# Patient Record
(demographics unavailable — no encounter records)

---

## 2025-03-05 NOTE — ASSESSMENT
[FreeTextEntry1] : 41yo female with hx colitis, with diarrhea and bleeding  will check labs and stool studies  if fecal calprotectin elevated will start budesonide consider prednisone as will be travelling  if negative and no signs of inflammation, then likely IBS with hemorrhoidal bleeding consider xifaxan in that setting

## 2025-03-05 NOTE — REVIEW OF SYSTEMS
[As Noted in HPI] : as noted in HPI [Diarrhea] : diarrhea [Bleeding] : bleeding [Negative] : Heme/Lymph

## 2025-03-05 NOTE — ASSESSMENT
[FreeTextEntry1] : 43yo female with hx colitis, with diarrhea and bleeding  will check labs and stool studies  if fecal calprotectin elevated will start budesonide consider prednisone as will be travelling  if negative and no signs of inflammation, then likely IBS with hemorrhoidal bleeding consider xifaxan in that setting

## 2025-03-05 NOTE — HISTORY OF PRESENT ILLNESS
[FreeTextEntry1] : 43yo female with colitis  She has recent increase in diarrhea, urgency with bleeding She is unsure if due to increased stress or represents a flare of her colitis

## 2025-03-05 NOTE — HISTORY OF PRESENT ILLNESS
[FreeTextEntry1] : 41yo female with colitis  She has recent increase in diarrhea, urgency with bleeding She is unsure if due to increased stress or represents a flare of her colitis

## 2025-03-05 NOTE — PHYSICAL EXAM
[Alert] : alert [Normal Voice/Communication] : normal voice/communication [Healthy Appearing] : healthy appearing [No Acute Distress] : no acute distress [Hearing Threshold Finger Rub Not [place]] : hearing was normal [Sclera] : the sclera and conjunctiva were normal [Normal Lips/Gums] : the lips and gums were normal [Oropharynx] : the oropharynx was normal [Normal Appearance] : the appearance of the neck was normal [No Neck Mass] : no neck mass was observed [No Respiratory Distress] : no respiratory distress [No Acc Muscle Use] : no accessory muscle use [Respiration, Rhythm And Depth] : normal respiratory rhythm and effort [Auscultation Breath Sounds / Voice Sounds] : lungs were clear to auscultation bilaterally [Heart Rate And Rhythm] : heart rate was normal and rhythm regular [Normal S1, S2] : normal S1 and S2 [Murmurs] : no murmurs [Bowel Sounds] : normal bowel sounds [Abdomen Tenderness] : non-tender [No Masses] : no abdominal mass palpated [Abdomen Soft] : soft [] : no hepatosplenomegaly [Oriented To Time, Place, And Person] : oriented to person, place, and time

## 2025-03-25 NOTE — PLAN
[FreeTextEntry1] : PT WITH LEVATOR SPASM. WILL REFER TO UROGYN FOR CONSIDERATION OF BOTOX TO PELVIC FLOOR. HAS TRIED PT WITHOUT SUCCESS.

## 2025-03-25 NOTE — PHYSICAL EXAM
[Chaperone Declined] : Patient declined chaperone [Appropriately responsive] : appropriately responsive [Alert] : alert [No Acute Distress] : no acute distress [Soft] : soft [Non-tender] : non-tender [Non-distended] : non-distended [No HSM] : No HSM [No Lesions] : no lesions [No Mass] : no mass [Oriented x3] : oriented x3 [Breast Reconstruction Right] : breast reconstruction [Breast Reconstruction Left] : breast reconstruction [Atrophy] : atrophy [Absent] : absent [FreeTextEntry4] : TIGHT INTROITUS, LEVATOR SPASM

## 2025-05-29 NOTE — REASON FOR VISIT
[Initial Visit ___] : an initial visit for [unfilled] [Questionnaire Received] : Patient questionnaire received [Intake Form Reviewed] : Patient intake form with past medical history, surgical history, family history and social history reviewed today [Sexual Dysfunction] : sexual dysfunction

## 2025-05-29 NOTE — PROCEDURE
[Straight Catheterization] : insertion of a straight catheter [Urinary Frequency] : urinary frequency [Patient] : the patient [___ Fr Straight Tip] : a [unfilled] in [place] straight tip catheter [None] : there were no complications with the catheter insertion [Clear] : clear [No Complications] : no complications [Tolerated Well] : the patient tolerated the procedure well [Post procedure instructions and information given] : Post procedure instructions and information were given and reviewed with patient. [0] : 0

## 2025-05-29 NOTE — PROCEDURE
[Straight Catheterization] : insertion of a straight catheter [Urinary Frequency] : urinary frequency [Patient] : the patient [___ Fr Straight Tip] : a [unfilled] in [place] straight tip catheter [None] : there were no complications with the catheter insertion [Clear] : clear [No Complications] : no complications [Tolerated Well] : the patient tolerated the procedure well [Post procedure instructions and information given] : Post procedure instructions and information were given and reviewed with patient. [0] : 0

## 2025-05-29 NOTE — HISTORY OF PRESENT ILLNESS
[Vaginal Wall Prolapse] : no [Rectal Prolapse] : no [Unable To Restrain Bowel Movement] : no [Urinary Frequency] : no [Feelings Of Urinary Urgency] : mild [x1] : nocturia once nightly [Constipation Obstructed Defecation] : no [Pelvic Pain] : no [Vaginal Pain] : no [Urinary Tract Infection] : mild [] : years ago [FreeTextEntry1] : JOE is a 43 year female who presents for dyspareunia. 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 H/o robotic TLH BSO for cancer prevention and found to have endo. S/p double mastectomy for pre-cancer, everyone in her family had breast cancer. She has not been able to have intercourse since going through menopause. Using estring for years. Went to PF PT with some improvement but was very time consuming and still not able to have intercourse. Tried diazepam 5mg suppositories which did not really help.  Reports pain at one spot with intercourse, muscle spasm like. She has had these symptoms since going through menopause 3 years ago. C/o frequency and urgency but admits to drinking a lot of water. Denies any constipation. She has ulcerative colitis. Denies pain outside of intercourse.     Daytime frequency: Yes Nocturia: 1x Urinary urgency: Yes Leakage of urine with urgency: No Leakage of urine with coughing sneezing laughing: No Sensation of incomplete bladder emptying: Yes History of frequent urinary tract infections: No History of hematuria: No Previous treatment: Vaginal estrogen, PF PT, Diazepam suppositories  Vaginal symptoms: Difficulty with sexual activity Bowel symptoms: Denies constipation      OB: 3 C/S GYN: LMP 10/2022, Negative pap 03/25/2025, H/o abnormal pap, Vaginal estrogen use PMH: IBS, Lupus, UC PSH: C/S x3, Abdominoplasty, Double mastectomy + Reconstruction, Hysterectomy, Breast reduction  Meds: Plaquenil, Budesonide, Trazodone, Vit D, Zyrtec, Xioxial, Turmeric, Vit C  Allx: Seasonal

## 2025-05-29 NOTE — PHYSICAL EXAM
[MA] : MA [No Acute Distress] : in no acute distress [Well developed] : well developed [Well Nourished] : ~L well nourished [Oriented x3] : oriented to person, place, and time [No Edema] : ~T edema was not present [Warm and Dry] : was warm and dry to touch [Vulvar Atrophy] : vulvar atrophy [Labia Majora] : were normal [Labia Minora] : were normal [Normal Appearance] : general appearance was normal [Atrophy] : atrophy [Normal] : was normal [Absent] : absent [FreeTextEntry2] :  Stephy Hampton [Cough] : no cough [Tenderness] : ~T no ~M abdominal tenderness observed [Distended] : not distended [de-identified] : no levator spasm noted, +qtip sensitivity at 6 o'clock [de-identified] : no POP

## 2025-05-29 NOTE — DISCUSSION/SUMMARY
[FreeTextEntry1] : JOE is a 43 year female who presents for dyspareunia. On exam, negative CST, normal PVR, no POP, no levator spasm noted, +qtip sensitivity at 6 o'clock.   Tried PF PT, valium suppositories with no improvement. 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 Pain is superficial and localized, only during intercourse. We talked about how botox would likely not help with this.   [] Philippe-Britney 2/2% compounded cream   [] Dilator use to assess if sx are improving   f/u Ani Casas All questions answered.

## 2025-05-29 NOTE — ADDENDUM
[FreeTextEntry1] : This note was written by Stephy Hampton, acting as the  for Dr. Duvall. This note accurately reflects the work and decisions made by Dr. Duvall.

## 2025-05-29 NOTE — PHYSICAL EXAM
[MA] : MA [No Acute Distress] : in no acute distress [Well developed] : well developed [Well Nourished] : ~L well nourished [Oriented x3] : oriented to person, place, and time [No Edema] : ~T edema was not present [Warm and Dry] : was warm and dry to touch [Vulvar Atrophy] : vulvar atrophy [Labia Majora] : were normal [Labia Minora] : were normal [Normal Appearance] : general appearance was normal [Atrophy] : atrophy [Normal] : was normal [Absent] : absent [FreeTextEntry2] :  Stephy Hampton [Cough] : no cough [Tenderness] : ~T no ~M abdominal tenderness observed [Distended] : not distended [de-identified] : no levator spasm noted, +qtip sensitivity at 6 o'clock [de-identified] : no POP

## 2025-05-29 NOTE — OB HISTORY
[ ___] : [unfilled]  section delivery(s) [Definite ___ (Date)] : the last menstrual period was [unfilled] [Last Pap Smear ___] : date of last pap smear was on [unfilled] [Abstaining d/t Present Problem] : abstaining due to present problem [Taking Estrogens] : taking estrogen replacement [Abnormal Pap Smear] : normal pap smear [Sexually Active] : is not sexually active [FreeTextEntry1] : Largest baby 8lbs 11oz

## 2025-07-24 NOTE — HISTORY OF PRESENT ILLNESS
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   Had robotic TLH BSO 3 yrs ago for cancer prevention and has not been able to have intercourse since her surgery. Prior to surgery had some discomfort from Stage 4 endo but no entry dyspareunia.  Now she endorses severe entry dyspareunia.  She has been using Estring x 2-3 years. She had a double mastectomy for LCIS.  Saw Dr. Lorenzo summer 2024, was dx'd with PFD and was sent to PFPT. She went to [place] [facility] 2-3x/week for "months" and used V5 suppositories without improvement, still unable to have penetrative intercourse. She concurrently did dilation therapy and was able to insert the largest dilator, however, still unable to have penetrative intercourse.  Saw Dr. Duvall May 2025. Gave topical cream which pt did not use. Gave lidocaine which pt did not use.  She endorses severe vasomotor symptoms of menopause: night sweats, insomnia, daytime fatigue, brain fog. She was given Veozah but did not start it.  Menarche = age 12, reg q month OCP start = age 19 x 8yrs Paps remote hx HPV changes, all f/u wnl No RBV RVVC Able to wear tampons when she had her menses  No pain with prolonged sitting Discomfort with tight clothes. +pain with cycling   No RUTIs, kidney stones. Daytime frequency commensurate with amount of water she drinks; nocturia x 1, no hematuria, no incontinence, complete bladder emptying.  SLE, colitis

## 2025-07-24 NOTE — PHYSICAL EXAM
[No Acute Distress] : in no acute distress [Well developed] : well developed [Well Nourished] : ~L well nourished [Good Hygeine] : demonstrates good hygeine [Oriented x3] : oriented to person, place, and time [Normal Memory] : ~T memory was ~L unimpaired [Normal Mood/Affect] : mood and affect are normal [Respirations regular] : ~T respiratory rate was regular [No Edema] : ~T edema was not present [Warm and Dry] : was warm and dry to touch [Normal Gait] : gait was normal [No Lesions] : no lesions were seen on the external genitalia [Vulvar Atrophy] : vulvar atrophy [Labia Majora] : were normal [Labia Minora] : were normal [Normal Appearance] : general appearance was normal [Estrogen Effect] : estrogen effect was observed [No Bleeding] : there was no active vaginal bleeding [Normal] : no abnormalities [Exam Deferred] : was deferred [Tenderness] : ~T no ~M abdominal tenderness observed [Distended] : not distended [de-identified] : Q-tip touch test 6/10 @ 1,5,6,7,11 and 4/10 @ 12; paleness at introitus with reactive erythema at ostia; no ulcerations, erosions [FreeTextEntry4] : PFMs wnl, supple, nontender to palpation

## 2025-07-24 NOTE — DISCUSSION/SUMMARY
[FreeTextEntry1] : Lengthy discussion re: LPV, dyspareunia, GSM. I reviewed the pathologies, possible etiologies, diagnosis, symptoms and treatment options available. Written information was given to the patient.  GKL 5/5% topical cream ftp to introitus BID. Amount and location of cream application was demonstrated to patient today in office via mirror demonstration. I explained that the cream does not work overnight, and she needs to continue it for a minimum of 6-8 weeks before we re-evaluate efficacy.  Amitriptyline 10-30mg po QPM. Titration schedule given. SE rev'd including but not limited to constipation, dry mouth, weight gain, vivid dreams, palpitations, dizziness, drowsiness. Pt verbalized understanding.  Vulvar health techniques rev'd in detail: -Warm baths x15-20 mins QD with 2 cups Epsom salt or Aveeno oatmeal -Unscented soaps, body washes, bath gels -No fabric softeners or dryer sheets on underwear -Unscented pads (NOT ALWAYS) -Ice to vulva -Hypoallergenic lubricants -White Crisco as needed  RTO 6-8 weeks